# Patient Record
Sex: FEMALE | URBAN - METROPOLITAN AREA
[De-identification: names, ages, dates, MRNs, and addresses within clinical notes are randomized per-mention and may not be internally consistent; named-entity substitution may affect disease eponyms.]

---

## 2021-08-17 ENCOUNTER — APPOINTMENT (RX ONLY)
Dept: URBAN - METROPOLITAN AREA CLINIC 44 | Facility: CLINIC | Age: 46
Setting detail: DERMATOLOGY
End: 2021-08-17

## 2021-08-17 DIAGNOSIS — L82.1 OTHER SEBORRHEIC KERATOSIS: ICD-10-CM

## 2021-08-17 DIAGNOSIS — Z71.89 OTHER SPECIFIED COUNSELING: ICD-10-CM

## 2021-08-17 DIAGNOSIS — L30.0 NUMMULAR DERMATITIS: ICD-10-CM

## 2021-08-17 DIAGNOSIS — D22 MELANOCYTIC NEVI: ICD-10-CM

## 2021-08-17 DIAGNOSIS — L20.89 OTHER ATOPIC DERMATITIS: ICD-10-CM

## 2021-08-17 DIAGNOSIS — L81.4 OTHER MELANIN HYPERPIGMENTATION: ICD-10-CM

## 2021-08-17 PROBLEM — L20.84 INTRINSIC (ALLERGIC) ECZEMA: Status: ACTIVE | Noted: 2021-08-17

## 2021-08-17 PROBLEM — D22.5 MELANOCYTIC NEVI OF TRUNK: Status: ACTIVE | Noted: 2021-08-17

## 2021-08-17 PROBLEM — L30.9 DERMATITIS, UNSPECIFIED: Status: ACTIVE | Noted: 2021-08-17

## 2021-08-17 PROCEDURE — ? PRODUCT LINE (HYPERPIGMENTATION)

## 2021-08-17 PROCEDURE — 99203 OFFICE O/P NEW LOW 30 MIN: CPT | Mod: 25

## 2021-08-17 PROCEDURE — ? OBSERVATION

## 2021-08-17 PROCEDURE — ? PRESCRIPTION MEDICATION MANAGEMENT

## 2021-08-17 PROCEDURE — ? COUNSELING

## 2021-08-17 PROCEDURE — ? SUNSCREEN RECOMMENDATIONS

## 2021-08-17 PROCEDURE — 11104 PUNCH BX SKIN SINGLE LESION: CPT

## 2021-08-17 PROCEDURE — ? BIOPSY BY PUNCH METHOD

## 2021-08-17 ASSESSMENT — LOCATION DETAILED DESCRIPTION DERM
LOCATION DETAILED: GLABELLA
LOCATION DETAILED: LEFT RIB CAGE
LOCATION DETAILED: PERIUMBILICAL SKIN
LOCATION DETAILED: LEFT CENTRAL MALAR CHEEK
LOCATION DETAILED: LEFT INFERIOR MEDIAL UPPER BACK
LOCATION DETAILED: RIGHT INFERIOR MEDIAL UPPER BACK
LOCATION DETAILED: LEFT ANTERIOR DISTAL THIGH
LOCATION DETAILED: LEFT CENTRAL MALAR CHEEK

## 2021-08-17 ASSESSMENT — LOCATION ZONE DERM
LOCATION ZONE: FACE
LOCATION ZONE: LEG
LOCATION ZONE: TRUNK
LOCATION ZONE: FACE
LOCATION ZONE: TRUNK

## 2021-08-17 ASSESSMENT — LOCATION SIMPLE DESCRIPTION DERM
LOCATION SIMPLE: LEFT THIGH
LOCATION SIMPLE: RIGHT UPPER BACK
LOCATION SIMPLE: LEFT UPPER BACK
LOCATION SIMPLE: LEFT CHEEK
LOCATION SIMPLE: LEFT CHEEK
LOCATION SIMPLE: ABDOMEN
LOCATION SIMPLE: GLABELLA
LOCATION SIMPLE: ABDOMEN

## 2021-08-17 NOTE — HPI: FULL BODY SKIN EXAMINATION
How Severe Are Your Spot(S)?: mild
What Type Of Note Output Would You Prefer (Optional)?: Bullet Format
What Is The Reason For Today's Visit?: Full Body Skin Examination
Additional History: Pt is present for a rash on left cheek, along with having eczema all over legs an hands

## 2021-08-17 NOTE — PROCEDURE: BIOPSY BY PUNCH METHOD
Body Location Override (Optional - Billing Will Still Be Based On Selected Body Map Location If Applicable): left zygoma
Detail Level: Detailed
Was A Bandage Applied: Yes
Punch Size In Mm: 2
Biopsy Type: H and E
Anesthesia Type: 1% lidocaine with epinephrine
Anesthesia Volume In Cc (Will Not Render If 0): 0.5
Additional Anesthesia Volume In Cc (Will Not Render If 0): 0
Hemostasis: None
Epidermal Sutures: Talia Kam
Wound Care: Petrolatum
Dressing: bandage
Patient Will Remove Sutures At Home?: No
Lab: [id number]
Lab Facility: [address] [place] [address]
Consent: Written consent was obtained and risks were reviewed including but not limited to scarring, infection, bleeding, scabbing, incomplete removal, nerve damage and allergy to anesthesia.
Post-Care Instructions: I reviewed with the patient in detail post-care instructions. Patient is to keep the biopsy site dry overnight, and then apply bacitracin twice daily until healed. Patient may apply hydrogen peroxide soaks to remove any crusting.
Home Suture Removal Text: Patient was provided a home suture removal kit and will remove their sutures at home. If they have any questions or difficulties they will call the office.
Notification Instructions: Patient will be notified of biopsy results. However, patient instructed to call the office if not contacted within 2 weeks.
Billing Type: [employer]
Information: Selecting Yes will display possible errors in your note based on the variables you have selected. This validation is only offered as a suggestion for you. PLEASE NOTE THAT THE VALIDATION TEXT WILL BE REMOVED WHEN YOU FINALIZE YOUR NOTE. IF YOU WANT TO FAX A PRELIMINARY NOTE YOU WILL NEED TO TOGGLE THIS TO 'NO' IF YOU DO NOT WANT IT IN YOUR FAXED NOTE.

## 2021-08-17 NOTE — PROCEDURE: PRODUCT LINE (HYPERPIGMENTATION)
Risk Of Complication Category: Moderate (Prescription Medication Management)
Allow Plan To Count Towards E/M Coding: Yes
Detail Level: Zone
Assigning Risk Information: Per AMA, level of risk is based upon consequences of the problem(s) addressed at the encounter when appropriately treated. Risk also includes medical decision making related to the need to initiate or forego further testing, treatment and/or hospitalization. Over the counter medication are assigned a risk level of low. Prescription medication management is assigned a risk level of moderate.
Name Of Product 1: HQ10
Product 1 Price (In Dollars - Numeric Only, No Special Characters Or $): 125.00
Product 1 Application Directions: Emailed from [email or web address]
Product 1 Units: 1
Product 2 Price (In Dollars - Numeric Only, No Special Characters Or $): 0.00
Product 2 Units: 0

## 2021-09-07 ENCOUNTER — APPOINTMENT (RX ONLY)
Dept: URBAN - METROPOLITAN AREA CLINIC 44 | Facility: CLINIC | Age: 46
Setting detail: DERMATOLOGY
End: 2021-09-07

## 2021-09-07 DIAGNOSIS — L29.89 OTHER PRURITUS: ICD-10-CM

## 2021-09-07 DIAGNOSIS — L29.8 OTHER PRURITUS: ICD-10-CM

## 2021-09-07 DIAGNOSIS — L81.4 OTHER MELANIN HYPERPIGMENTATION: ICD-10-CM

## 2021-09-07 DIAGNOSIS — L20.89 OTHER ATOPIC DERMATITIS: ICD-10-CM

## 2021-09-07 DIAGNOSIS — Z71.89 OTHER SPECIFIED COUNSELING: ICD-10-CM

## 2021-09-07 PROBLEM — L20.84 INTRINSIC (ALLERGIC) ECZEMA: Status: ACTIVE | Noted: 2021-09-07

## 2021-09-07 PROCEDURE — 99213 OFFICE O/P EST LOW 20 MIN: CPT | Mod: 25

## 2021-09-07 PROCEDURE — ? INTRALESIONAL KENALOG

## 2021-09-07 PROCEDURE — ? PRESCRIPTION

## 2021-09-07 PROCEDURE — ? PRESCRIPTION MEDICATION MANAGEMENT

## 2021-09-07 PROCEDURE — ? COUNSELING

## 2021-09-07 PROCEDURE — 11900 INJECT SKIN LESIONS </W 7: CPT

## 2021-09-07 RX ORDER — BETAMETHASONE DIPROPIONATE 0.5 MG/G
OINTMENT, AUGMENTED TOPICAL
Qty: 45 | Refills: 0 | Status: ERX | COMMUNITY
Start: 2021-09-07

## 2021-09-07 RX ADMIN — BETAMETHASONE DIPROPIONATE: 0.5 OINTMENT, AUGMENTED TOPICAL at 00:00

## 2021-09-07 ASSESSMENT — LOCATION SIMPLE DESCRIPTION DERM
LOCATION SIMPLE: LEFT THIGH
LOCATION SIMPLE: RIGHT UPPER BACK
LOCATION SIMPLE: LEFT CHEEK
LOCATION SIMPLE: UPPER BACK

## 2021-09-07 ASSESSMENT — LOCATION DETAILED DESCRIPTION DERM
LOCATION DETAILED: LEFT ANTERIOR DISTAL THIGH
LOCATION DETAILED: LEFT CENTRAL MALAR CHEEK
LOCATION DETAILED: LEFT SUPERIOR CENTRAL MALAR CHEEK
LOCATION DETAILED: RIGHT INFERIOR MEDIAL UPPER BACK
LOCATION DETAILED: INFERIOR THORACIC SPINE

## 2021-09-07 ASSESSMENT — LOCATION ZONE DERM
LOCATION ZONE: TRUNK
LOCATION ZONE: FACE
LOCATION ZONE: LEG

## 2021-09-07 NOTE — PROCEDURE: PRESCRIPTION MEDICATION MANAGEMENT
Render In Strict Bullet Format?: No
Detail Level: Zone
Continue Regimen: TAC occasionally for face. Betamethasone for extremities/torso. Will work on getting [address] [facility] [place] approved.
Initiate Treatment: Ok to start HQ10

## 2021-09-07 NOTE — PROCEDURE: INTRALESIONAL KENALOG
Medical Necessity Clause: This procedure was medically necessary because the lesions that were treated were:
Ndc# For Kenalog Only: 2487-1105-42
Include Z78.9 (Other Specified Conditions Influencing Health Status) As An Associated Diagnosis?: No
Consent: The risks of atrophy were reviewed with the patient.
Validate Note Data When Using Inventory: Yes
Total Volume Injected (Ccs- Only Use Numbers And Decimals): 0.5
Lot # (Optional): [id number]
Kenalog Preparation: Kenalog
Treatment Number (Optional): 1
Concentration Of Solution Injected (Mg/Ml): 5.0
X Size Of Lesion In Cm (Optional): 0
Detail Level: Detailed
Expiration Date (Optional): [id number]

## 2021-09-27 NOTE — PROCEDURE: SUNSCREEN RECOMMENDATIONS
"Well Child Nutrition, 6-12 Years Old  This sheet provides general nutrition recommendations. Talk with a health care provider or a diet and nutrition specialist (dietitian) if you have any questions.  Nutrition    Balanced diet  · Provide your child with a balanced diet. Provide healthy meals and snacks for your child. Aim for the recommended daily amounts depending on your child's health and nutrition needs. Try to include:  ? Fruits. Aim for 1-1½ cups a day. Examples of 1 cup of fruit include 1 large banana, 1 small apple, 8 large strawberries, or 1 large orange.  ? Vegetables. Aim for 1½-2½ cups a day. Examples of 1 cup of vegetables include 2 medium carrots, 1 large tomato, or 2 stalks of celery.  ? Low-fat dairy. Aim for 2½-3 cups a day. Examples of 1 cup of dairy include 8 oz (230 mL) of milk, 8 oz (230 g) of yogurt, or 1½ oz (44 g) of natural cheese.  ? Whole grains. Of the grain foods that your child eats each day (such as pasta, rice, and tortillas), aim to include 3-6 \"ounce-equivalents\" of whole-grain options. Examples of 1 ounce-equivalent of whole grains include 1 cup of whole-wheat cereal, ½ cup of brown rice, or 1 slice of whole-wheat bread.  ? Lean proteins. Aim for 4-5 \"ounce-equivalents\" a day.  § A cut of meat or fish that is the size of a deck of cards is about 3-4 ounce-equivalents.  § Foods that provide 1 ounce-equivalent of protein include 1 egg, ½ cup of nuts or seeds, or 1 tablespoon (16 g) of peanut butter.  For more information and options for foods in a balanced diet, visit www.choosemyplate.gov  Calcium intake  · Encourage your child to drink low-fat milk and eat low-fat dairy products. Adequate calcium intake is important in growing children and teens. If your child does not drink dairy milk or eat dairy products, encourage him or her to eat other foods that contain calcium. Alternate sources of calcium include:  ? Dark, leafy greens.  ? Canned fish.  ? Calcium-enriched juices, breads, "
and cereals.  Healthy eating habits    · Model healthy food choices, and limit fast food choices and junk food.  · Limit daily intake of fruit juice to 4-6 oz (120-180 mL). Give your child juice that contains vitamin C and is made from 100% juice without additives. To limit your child's intake, try to serve juice only with meals.  · Try not to give your child foods that are high in fat, salt (sodium), or sugar. These include things like candy, chips, or cookies.  · Make sure your child eats breakfast at home or at school every day.  · Encourage your child to drink plenty of water. Try not to give your child sugary beverages or sodas.    General instructions  · Try to eat meals together as a family and encourage conversation during meals.  · Encourage your child to help with meal planning and preparation. When you think your child is ready, teach him or her how to make simple meals and snacks (such as a sandwich or popcorn).  · Body image and eating problems may start to develop at this age. Monitor your child closely for any signs of these issues, and contact your child's health care provider if you have any concerns.  · Food allergies may cause your child to have a reaction (such as a rash, diarrhea, or vomiting) after eating or drinking. Talk with your child's health care provider if you have concerns about food allergies.  Summary  · Encourage your child to drink water or low-fat milk instead of sugary beverages or sodas.  · Make sure your child eats breakfast every day.  · When you think your child is ready, teach him or her how to make simple meals and snacks (such as a sandwich or popcorn).  · Monitor your child for any signs of body image issues or eating problems, and contact your child's health care provider if you have any concerns.  This information is not intended to replace advice given to you by your health care provider. Make sure you discuss any questions you have with your health care 
provider.  Document Revised: 04/07/2020 Document Reviewed: 08/01/2018  [employer] Patient Education © 2021 [employer]      Well Child Safety, 6-12 Years Old  This sheet provides general safety recommendations. Talk with a health care provider if you have any questions.  Home safety  · Provide a tobacco-free and drug-free environment for your child.  · Have your home checked for lead paint, especially if you live in a house or apartment that was built before 1978.  · Equip your home with smoke detectors and carbon monoxide detectors. Test them once a month. Change their batteries every year.  · Keep all medicines, knives, poisons, chemicals, and cleaning products capped and out of your child's reach.  · If you have a trampoline, put a safety fence around it.  · If you keep guns and ammunition in the home, make sure they are stored separately and locked away. Your child should not know the lock combination or where the key is kept.  · Make sure power tools and other equipment are unplugged or locked away.  Motor vehicle safety  · Restrain your child in a belt-positioning booster seat until the normal seat belts fit properly. Car seat belts usually fit properly when a child reaches a height of 4 ft 9 in (145 cm). This usually happens between the ages of 8 and 12 years old.  · Never allow or place your child in the front seat of a car that has front-seat airbags.  · Discourage your child from using all-terrain vehicles (ATVs) or other motorized vehicles. If your child is going to ride in them, supervise your child and emphasize the importance of wearing a helmet and following safety rules.  Sun safety    · Avoid taking your child outdoors during peak sun hours (between 10 a.m. and 4 p.m.). A sunburn can lead to more serious skin problems later in life.  · Make sure your child wears weather-appropriate clothing, hats, or other coverings. To protect from the sun, clothing should cover arms and legs and hats should have a 
wide brim.  · Teach your child how to use sunscreen. Your child should apply a broad-spectrum sunscreen that protects against UVA and UVB radiation (SPF 15 or higher) to his or her skin when out in the sun. Have your child:  ? Apply sunscreen 15-30 minutes before going outside.  ? Reapply sunscreen every 2 hours, or more often if your child gets wet or is sweating.  Water safety  · To help prevent drowning, have your child:  ? Take swimming lessons.  ? Only swim in designated areas with a .  ? Never swim alone.  ? Wear a properly-fitting life jacket that is approved by the [place] when swimming or on a boat.  · Put a fence with a self-closing, self-latching gate around home pools. The fence should separate the pool from your house. Consider using pool alarms or covers.  Talking to your child about safety  · Discuss the following topics with your child:  ? Fire escape plans.  ? Street safety.  ? Water safety.  ? Bus safety, if applicable.  ? Appropriate use of medicines, especially if your child takes medicine on a regular basis.  ? Drug, alcohol, and tobacco use among friends or at friends' homes.  · Tell your child not to:  ? Go anywhere with a stranger.  ? Accept gifts or other items from a stranger.  ? Play with matches, lighters, or candles.  · Make it clear that no adult should tell your child to keep a secret or ask to see or touch your child's private parts. Encourage your child to tell you about inappropriate touching.  · Warn your child about walking up to unfamiliar animals, especially dogs that are eating.  · Tell your child that if he or she ever feels unsafe, such as at a party or someone else's home, your child should ask to go home or call you to be picked up.  · Make sure your child knows:  ? His or her first and last name, address, and phone number.  ? Both parents' complete names and cell phone or work phone numbers.  ? How to call local emergency services (911 in [place]  General 
instructions    · Closely supervise your child's activities. Avoid leaving your child at home without supervision.  · Have an adult supervise your child at all times when playing near a street or body of water, and when playing on a trampoline. Allow only one person on a trampoline at a time.  · Be careful when handling hot liquids and sharp objects around your child.  · Get to know your child's friends and their parents.  · Monitor gang activity in your neighborhood and local schools.  · Make sure your child wears necessary safety equipment while playing sports or while riding a bicycle, skating, or skateboarding. This may include a properly fitting helmet, mouth guard, shin guards, knee and elbow pads, and safety glasses. Adults should set a good example by also wearing safety equipment and following safety rules.  · Know the phone number for your local poison control center and keep it by the phone or on your refrigerator.  Where to find more information:  · American Academy of Pediatrics: www.healthychildren.org  · Centers for Disease Control and Prevention: www.cdc.gov  Summary  · Protect your child from sun exposure by teaching your child how to apply sunscreen.  · Make sure your child wears proper safety equipment during activities. This may include a helmet, mouth guard, shin guards, a life jacket, and safety glasses.  · Talk with your child about safety outside the home, including street and water safety, bus safety, and staying safe around strangers and animals.  · Talk to your child regularly about drugs, tobacco, and alcohol, and discuss use among friends or at friends' homes.  · Teach your child what to do in case of an emergency, including a fire escape plan and how to call 911.  This information is not intended to replace advice given to you by your health care provider. Make sure you discuss any questions you have with your health care provider.  Document Revised: 06/08/2020 Document Reviewed: 
07/30/2018  [employer] Patient Education © 2021 [employer]      
General Sunscreen Counseling: I recommended a broad spectrum sunscreen with a SPF of 30 or higher. I explained that SPF 30 sunscreens block approximately 97 percent of the sun's harmful rays. Sunscreens should be applied at least 15 minutes prior to expected sun exposure and then every 2 hours after that as long as sun exposure continues. If swimming or exercising sunscreen should be reapplied every 45 minutes to an hour after getting wet or sweating. One ounce, or the equivalent of a shot glass full of sunscreen, is adequate to protect the skin not covered by a bathing suit. I also recommended a lip balm with a sunscreen as well. Sun protective clothing can be used in lieu of sunscreen but must be worn the entire time you are exposed to the sun's rays.
Detail Level: Detailed

## 2021-11-05 ENCOUNTER — RX ONLY (OUTPATIENT)
Age: 46
Setting detail: RX ONLY
End: 2021-11-05

## 2021-11-05 RX ORDER — AMINOLEVULINIC ACID HYDROCHLORIDE 20 %
KIT TOPICAL
Qty: 2 | Refills: 2 | Status: ERX | COMMUNITY
Start: 2021-11-05